# Patient Record
Sex: FEMALE | Race: BLACK OR AFRICAN AMERICAN | URBAN - METROPOLITAN AREA
[De-identification: names, ages, dates, MRNs, and addresses within clinical notes are randomized per-mention and may not be internally consistent; named-entity substitution may affect disease eponyms.]

---

## 2017-01-04 ENCOUNTER — OFFICE VISIT (OUTPATIENT)
Dept: OBGYN | Facility: CLINIC | Age: 42
End: 2017-01-04
Payer: COMMERCIAL

## 2017-01-04 VITALS
HEIGHT: 70 IN | WEIGHT: 215 LBS | DIASTOLIC BLOOD PRESSURE: 78 MMHG | BODY MASS INDEX: 30.78 KG/M2 | SYSTOLIC BLOOD PRESSURE: 112 MMHG

## 2017-01-04 DIAGNOSIS — Z09 POSTOPERATIVE EXAMINATION: Primary | ICD-10-CM

## 2017-01-04 DIAGNOSIS — Z09 POSTOP CHECK: Primary | ICD-10-CM

## 2017-01-04 LAB — HGB BLD-MCNC: 9 G/DL (ref 11.7–15.7)

## 2017-01-04 PROCEDURE — 85018 HEMOGLOBIN: CPT | Performed by: OBSTETRICS & GYNECOLOGY

## 2017-01-04 PROCEDURE — 36415 COLL VENOUS BLD VENIPUNCTURE: CPT | Performed by: OBSTETRICS & GYNECOLOGY

## 2017-01-04 PROCEDURE — 99212 OFFICE O/P EST SF 10 MIN: CPT | Performed by: OBSTETRICS & GYNECOLOGY

## 2017-01-04 NOTE — PROGRESS NOTES
The patient was seen for her postoperative check. Her hemoglobin is 9 g percent. And she needs to be on iron twice a day. Her pathology over resect the fibroid was benign. The intraoperative photos of resection of 3 fibroids and the thick band was undertaken and explained at length to the patient. Bimanual examination is unremarkable. The patient will return in 3 months for a follow-up myoma check.

## 2017-01-04 NOTE — MR AVS SNAPSHOT
"              After Visit Summary   1/4/2017    Jose Antonio Johnson    MRN: [id number]           Patient Information     Date Of Birth          1975        Visit Information        Provider Department      1/4/2017 2:15 PM Raheem Heath MD [facility]        Today's Diagnoses     Postop check    -  1        Follow-ups after your visit        Who to contact     If you have questions or need follow up information about today's clinic visit or your schedule please contact [facility] directly at [phone].  Normal or non-critical lab and imaging results will be communicated to you by mCASHhart, letter or phone within 4 business days after the clinic has received the results. If you do not hear from us within 7 days, please contact the clinic through [employer] or phone. If you have a critical or abnormal lab result, we will notify you by phone as soon as possible.  Submit refill requests through [employer] or call your pharmacy and they will forward the refill request to us. Please allow 3 business days for your refill to be completed.          Additional Information About Your Visit        MyChart Information     [employer] gives you secure access to your electronic health record. If you see a primary care provider, you can also send messages to your care team and make appointments. If you have questions, please call your primary care clinic.  If you do not have a primary care provider, please call [phone] and they will assist you.        Care EveryWhere ID     This is your Care EveryWhere ID. This could be used by other organizations to access your [facility] medical records  [id number]        Your Vitals Were     Height BMI (Body Mass Index) Last Period             5' 10\" (1.778 m) 30.85 kg/m2 12/22/2016          Blood Pressure from Last 3 Encounters:   01/04/17 112/78   12/22/16 108/73   12/08/16 120/86    Weight from Last 3 Encounters:   01/04/17 215 lb (97.523 kg)   12/22/16 " 211 lb 11.2 oz (96.026 kg)   12/08/16 214 lb (97.07 kg)              Today, you had the following     No orders found for display       Primary Care Provider    None       No address on file        Thank you!     Thank you for choosing [facility]  for your care. Our goal is always to provide you with excellent care. Hearing back from our patients is one way we can continue to improve our services. Please take a few minutes to complete the written survey that you may receive in the mail after your visit with us. Thank you!             Your Updated Medication List - Protect others around you: Learn how to safely use, store and throw away your medicines at www.disposemymeds.org.          This list is accurate as of: 1/4/17  3:44 PM.  Always use your most recent med list.                   Brand Name Dispense Instructions for use    estrogens (conjugated) 1.25 MG tablet    PREMARIN    21 tablet    Take 1 tablet (1.25 mg) by mouth daily

## 2017-04-07 ENCOUNTER — RECORDS - HEALTHEAST (OUTPATIENT)
Dept: ADMINISTRATIVE | Facility: OTHER | Age: 42
End: 2017-04-07

## 2017-04-07 ENCOUNTER — AMBULATORY - HEALTHEAST (OUTPATIENT)
Dept: LAB | Facility: CLINIC | Age: 42
End: 2017-04-07

## 2017-04-07 DIAGNOSIS — N97.0 FEMALE INFERTILITY ASSOCIATED WITH ANOVULATION: ICD-10-CM

## 2017-04-07 DIAGNOSIS — Z32.00 PREGNANCY EXAMINATION OR TEST, PREGNANCY UNCONFIRMED: ICD-10-CM

## 2017-07-24 ENCOUNTER — TELEPHONE (OUTPATIENT)
Dept: FAMILY MEDICINE | Facility: CLINIC | Age: 42
End: 2017-07-24

## 2017-07-24 ENCOUNTER — OFFICE VISIT (OUTPATIENT)
Dept: FAMILY MEDICINE | Facility: CLINIC | Age: 42
End: 2017-07-24
Payer: COMMERCIAL

## 2017-07-24 ENCOUNTER — NURSE TRIAGE (OUTPATIENT)
Dept: NURSING | Facility: CLINIC | Age: 42
End: 2017-07-24

## 2017-07-24 VITALS
TEMPERATURE: 98.2 F | WEIGHT: 214 LBS | BODY MASS INDEX: 30.71 KG/M2 | HEART RATE: 106 BPM | OXYGEN SATURATION: 100 % | DIASTOLIC BLOOD PRESSURE: 76 MMHG | SYSTOLIC BLOOD PRESSURE: 118 MMHG

## 2017-07-24 DIAGNOSIS — E04.9 ENLARGED THYROID: ICD-10-CM

## 2017-07-24 DIAGNOSIS — G47.00 INSOMNIA, UNSPECIFIED TYPE: ICD-10-CM

## 2017-07-24 DIAGNOSIS — R00.2 PALPITATIONS: ICD-10-CM

## 2017-07-24 DIAGNOSIS — N92.6 MISSED PERIOD: Primary | ICD-10-CM

## 2017-07-24 DIAGNOSIS — E05.90 HYPERTHYROIDISM: ICD-10-CM

## 2017-07-24 LAB
BASOPHILS # BLD AUTO: 0 10E9/L (ref 0–0.2)
BASOPHILS NFR BLD AUTO: 0.2 %
DIFFERENTIAL METHOD BLD: ABNORMAL
EOSINOPHIL # BLD AUTO: 0 10E9/L (ref 0–0.7)
EOSINOPHIL NFR BLD AUTO: 0.5 %
ERYTHROCYTE [DISTWIDTH] IN BLOOD BY AUTOMATED COUNT: 19.4 % (ref 10–15)
HCG SERPL QL: NEGATIVE
HCT VFR BLD AUTO: 30.5 % (ref 35–47)
HGB BLD-MCNC: 9.2 G/DL (ref 11.7–15.7)
LYMPHOCYTES # BLD AUTO: 1.5 10E9/L (ref 0.8–5.3)
LYMPHOCYTES NFR BLD AUTO: 36 %
MCH RBC QN AUTO: 22 PG (ref 26.5–33)
MCHC RBC AUTO-ENTMCNC: 30.2 G/DL (ref 31.5–36.5)
MCV RBC AUTO: 73 FL (ref 78–100)
MONOCYTES # BLD AUTO: 0.7 10E9/L (ref 0–1.3)
MONOCYTES NFR BLD AUTO: 16.7 %
NEUTROPHILS # BLD AUTO: 2 10E9/L (ref 1.6–8.3)
NEUTROPHILS NFR BLD AUTO: 46.6 %
PLATELET # BLD AUTO: 254 10E9/L (ref 150–450)
RBC # BLD AUTO: 4.18 10E12/L (ref 3.8–5.2)
WBC # BLD AUTO: 4.2 10E9/L (ref 4–11)

## 2017-07-24 PROCEDURE — 84439 ASSAY OF FREE THYROXINE: CPT | Performed by: PHYSICIAN ASSISTANT

## 2017-07-24 PROCEDURE — 36415 COLL VENOUS BLD VENIPUNCTURE: CPT | Performed by: PHYSICIAN ASSISTANT

## 2017-07-24 PROCEDURE — 93000 ELECTROCARDIOGRAM COMPLETE: CPT | Performed by: PHYSICIAN ASSISTANT

## 2017-07-24 PROCEDURE — 86376 MICROSOMAL ANTIBODY EACH: CPT | Performed by: PHYSICIAN ASSISTANT

## 2017-07-24 PROCEDURE — 99215 OFFICE O/P EST HI 40 MIN: CPT | Performed by: PHYSICIAN ASSISTANT

## 2017-07-24 PROCEDURE — 80048 BASIC METABOLIC PNL TOTAL CA: CPT | Performed by: PHYSICIAN ASSISTANT

## 2017-07-24 PROCEDURE — 86800 THYROGLOBULIN ANTIBODY: CPT | Performed by: PHYSICIAN ASSISTANT

## 2017-07-24 PROCEDURE — 84703 CHORIONIC GONADOTROPIN ASSAY: CPT | Performed by: PHYSICIAN ASSISTANT

## 2017-07-24 PROCEDURE — 82728 ASSAY OF FERRITIN: CPT | Performed by: PHYSICIAN ASSISTANT

## 2017-07-24 PROCEDURE — 84443 ASSAY THYROID STIM HORMONE: CPT | Performed by: PHYSICIAN ASSISTANT

## 2017-07-24 PROCEDURE — 85025 COMPLETE CBC W/AUTO DIFF WBC: CPT | Performed by: PHYSICIAN ASSISTANT

## 2017-07-24 RX ORDER — TRAZODONE HYDROCHLORIDE 50 MG/1
50 TABLET, FILM COATED ORAL
Qty: 30 TABLET | Refills: 1 | Status: SHIPPED | OUTPATIENT
Start: 2017-07-24

## 2017-07-24 NOTE — TELEPHONE ENCOUNTER
New symptom of palpitation with chest burning for up to 1 minute , that started 7/20/17 . Agrees to appointment sent to . .Livia Hamlin RN [facility] nurse advisors.    Reason for Disposition    [1] Chest pain lasting <= 5 minutes AND [2] NO chest pain or cardiac symptoms now(Exceptions: pains lasting a few seconds)    Additional Information    Negative: Passed out (i.e., lost consciousness, collapsed and was not responding)    Negative: Shock suspected (e.g., cold/pale/clammy skin, too weak to stand, low BP, rapid pulse)    Negative: Difficult to awaken or acting confused  (e.g., disoriented, slurred speech)    Negative: Visible sweat on face or sweat dripping down face    Negative: Unable to walk, or can only walk with assistance (e.g., requires support)    Negative: [1] Received SHOCK from implantable cardiac defibrillator AND [2] persisting symptoms (i.e., palpitations, lightheadedness)    Negative: Sounds like a life-threatening emergency to the triager    Chest pain    Negative: Severe difficulty breathing (e.g., struggling for each breath, speaks in single words)    Negative: Difficult to awaken or acting confused (e.g., disoriented, slurred speech)    Negative: Shock suspected (e.g., cold/pale/clammy skin, too weak to stand, low BP, rapid pulse)    Negative: [1] Chest pain lasts > 5 minutes AND [2] history of heart disease  (i.e., heart attack, bypass surgery, angina, angioplasty, CHF; not just a heart murmur)    Negative: [1] Chest pain lasts > 5 minutes AND [2] described as crushing, pressure-like, or heavy    Negative: [1] Chest pain lasts > 5 minutes AND [2] age > 50    Negative: [1] Chest pain lasts > 5 minutes AND [2] age > 30 AND [3] at least one cardiac risk factor (i.e., hypertension, diabetes, obesity, smoker or strong family history of heart disease)    Negative: [1] Chest pain lasts > 5 minutes AND [2] not relieved with nitroglycerin    Negative: Passed out (i.e., lost consciousness,  "collapsed and was not responding)    Negative: Heart beating < 50 beats per minute OR > 140 beats per minute    Negative: Visible sweat on face or sweat dripping down face    Negative: Sounds like a life-threatening emergency to the triager    Negative: Followed a chest injury    Negative: SEVERE chest pain    Negative: [1] Intermittent  chest pain or \"angina\" AND [2] increasing in severity or frequency  (Exception: pains lasting a few seconds)    Negative: Pain also present in shoulder(s) or arm(s) or jaw  (Exception: pain is clearly made worse by movement)    Negative: Difficulty breathing    Negative: Dizziness or lightheadedness    Negative: Coughing up blood    Negative: Cocaine use within last 3 days    Negative: History of prior \"blood clot\" in leg or lungs (i.e., deep vein thrombosis, pulmonary embolism)    Negative: Recent illness requiring prolonged bedrest (i.e., immobilization)    Negative: Hip or leg fracture in past 2 months (e.g., had cast on leg or ankle)    Negative: Major surgery in the past month    Negative: Recent long-distance travel with prolonged time in car, bus, plane, or train (i.e., within past 2 weeks; 6 or  more hours duration)    Negative: Chest pain lasts > 5 minutes (Exceptions: chest pain occurring > 3 days ago and now asymptomatic; same as previously diagnosed heartburn and has accompanying sour taste in mouth)    Negative: Taking a deep breath makes pain worse    Negative: Patient sounds very sick or weak to the triager    Negative: [1] Chest pain lasts > 5 minutes AND [2] occurred > 3 days ago (72 hours) AND [3] NO chest pain or cardiac symptoms now    Protocols used: CHEST PAIN-ADULT-, HEART RATE AND HEARTBEAT QUESTIONS-ADULT-    "

## 2017-07-24 NOTE — TELEPHONE ENCOUNTER
Reason for call:  Same Day Appointment   Requested Provider: Any     PCP: [unfilled]    Reason for visit: Triage-heart palpatations    Duration of symptoms: see triage notes     Have you been treated for this in the past? No    Additional comments: none        Phone number to reach patient:  Home number on file [phone] (home)    Best Time:  any    Can we leave a detailed message on this number?  YES

## 2017-07-24 NOTE — MR AVS SNAPSHOT
After Visit Summary   7/24/2017    Jose Antonio Johnson    MRN: [id number]           Patient Information     Date Of Birth          1975        Visit Information        Provider Department      7/24/2017 3:00 PM Bella Jenkins PA-C [facility]        Today's Diagnoses     Missed period    -  1    Palpitations        Enlarged thyroid          Care Instructions    Labs today  Call to schedule thyroid ultrasound  Quit caffeine  Work on getting 8 hours of sleep every night  Return to clinic for any new or worsening symptoms or go to ER Urgent care in off hours            Follow-ups after your visit        Future tests that were ordered for you today     Open Future Orders        Priority Expected Expires Ordered    US Thyroid Routine  7/24/2018 7/24/2017            Who to contact     If you have questions or need follow up information about today's clinic visit or your schedule please contact [facility] directly at [phone].  Normal or non-critical lab and imaging results will be communicated to you by MyChart, letter or phone within 4 business days after the clinic has received the results. If you do not hear from us within 7 days, please contact the clinic through [employer] or phone. If you have a critical or abnormal lab result, we will notify you by phone as soon as possible.  Submit refill requests through [employer] or call your pharmacy and they will forward the refill request to us. Please allow 3 business days for your refill to be completed.          Additional Information About Your Visit        MyChart Information     [employer] gives you secure access to your electronic health record. If you see a primary care provider, you can also send messages to your care team and make appointments. If you have questions, please call your primary care clinic.  If you do not have a primary care provider, please call [phone] and they will assist you.        Care  EveryWhere ID     This is your Care EveryWhere ID. This could be used by other organizations to access your [facility] medical records  [id number]        Your Vitals Were     Pulse Temperature Pulse Oximetry BMI (Body Mass Index)          106 98.2  F (36.8  C) (Oral) 100% 30.71 kg/m2         Blood Pressure from Last 3 Encounters:   07/24/17 118/76   01/04/17 112/78   12/22/16 108/73    Weight from Last 3 Encounters:   07/24/17 214 lb (97.1 kg)   01/04/17 215 lb (97.5 kg)   12/22/16 211 lb 11.2 oz (96 kg)              We Performed the Following     ANTI THYROGLOBULIN ANTIBODY     Basic metabolic panel  (Ca, Cl, CO2, Creat, Gluc, K, Na, BUN)     CBC with platelets and differential     EKG 12-lead complete w/read - Clinics     Ferritin     HCG, qualitative     THYROID PEROXIDASE ANTIBODY     TSH with free T4 reflex        Primary Care Provider    None       No address on file        Equal Access to Services     CRISTINA FARNSWORTH : Kiya Montgomery, wasteve stephens, qaliza kaalmarobin rai, mallory ayon . So [facility] [phone].    ATENCIÓN: Si nickla español, tiene a wiley disposición servicios gratuitos de asistencia lingüística. Llame al [phone].    We comply with applicable federal civil rights laws and [place] laws. We do not discriminate on the basis of race, color, national origin, age, disability sex, sexual orientation or gender identity.            Thank you!     Thank you for choosing [facility]  for your care. Our goal is always to provide you with excellent care. Hearing back from our patients is one way we can continue to improve our services. Please take a few minutes to complete the written survey that you may receive in the mail after your visit with us. Thank you!             Your Updated Medication List - Protect others around you: Learn how to safely use, store and throw away your medicines at www.disposemymeds.org.          This list is accurate  as of: 7/24/17  3:35 PM.  Always use your most recent med list.                   Brand Name Dispense Instructions for use Diagnosis    estrogens (conjugated) 1.25 MG tablet    PREMARIN    21 tablet    Take 1 tablet (1.25 mg) by mouth daily    Intramural leiomyoma of uterus

## 2017-07-24 NOTE — NURSING NOTE
"Chief Complaint   Patient presents with     Palpitations       Initial /76  Pulse 106  Temp 98.2  F (36.8  C) (Oral)  Wt 214 lb (97.1 kg)  SpO2 100%  BMI 30.71 kg/m2 Estimated body mass index is 30.71 kg/(m^2) as calculated from the following:    Height as of 1/4/17: 5' 10\" (1.778 m).    Weight as of this encounter: 214 lb (97.1 kg).  Medication Reconciliation: complete     Blanco Farfan MA      "

## 2017-07-24 NOTE — PROGRESS NOTES
SUBJECTIVE:                                                    Jose Antonio Johnson is a 41 year old female who presents to clinic today for the following health issues:    Palpitations      Onset: Last Thursday    Description:   Location:  entire chest  Character: racing and skipping beat.   Radiation: none  Duration: couple minutes     Intensity: mild    Progression of Symptoms:  same    Accompanying Signs & Symptoms:  Shortness of breath: no   Sweating: no   Nausea/vomiting: no   Lightheadedness: no   Palpitations: YES    Fever/Chills: YES- sometimes but no fever  Cough: no, but fatigue and shakiness   Heartburn: no     History:   Family history of heart disease YES- father  Tobacco use: no     Precipitating factors:   Worse with exertion: YES  Worse with deep breaths :  no   Related to food: no     Alleviating factors:  none     Therapies Tried and outcome: None    They will be going with an egg donor  IVF isn't working    They will be moving to [place] because her  is done with his fellowship.     She has been getting 4-5 hours of sleep per day. 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     History of iron deficiency anemia  She stopped her supplements    No dry skin, thinning hair, heat or cold intolerance    Reports she's 2 days late for her period. She is very regular and usually never misses a period. They have been trying to get pregnant this month      Problem list and histories reviewed & adjusted, as indicated.  Additional history: as documented    ROS:  C: NEGATIVE for fever, chills, change in weight  I: NEGATIVE for worrisome rashes, moles or lesions  E: NEGATIVE for vision changes or irritation  E/M: NEGATIVE for ear, mouth and throat problems  R: NEGATIVE for significant cough or SOB  B:  NEGATIVE for masses, tenderness or discharge  CV: NEGATIVE for chest pain/chest pressure, dyspnea on exertion, lower extremity edema, orthopnea and syncope or near-syncope  GI: NEGATIVE for nausea, abdominal pain, heartburn, or change in bowel habits  : NEGATIVE for frequency, dysuria, or hematuria  M: NEGATIVE for significant arthralgias or myalgia  N: NEGATIVE for weakness, dizziness or paresthesias  E: NEGATIVE for temperature intolerance, skin/hair changes  H: NEGATIVE for bleeding problems  P: NEGATIVE for changes in mood or affect    Patient Active Problem List   Diagnosis     Iron deficiency anemia     Intramural leiomyoma of uterus     Female infertility     Past Surgical History:   Procedure Laterality Date     HYSTEROSCOPY      fibroid removal     HYSTEROSCOPY, ABLATE ENDOMETRIUM VERSAPOINT , COMBINED N/A 12/22/2016    Procedure: COMBINED HYSTEROSCOPY, ABLATE ENDOMETRIUM VERSAPOINT;  Surgeon: Raheem Heath MD;  Location: [facility]       Social History   Substance Use Topics     Smoking status: Never Smoker     Smokeless tobacco: Never Used     Alcohol use Yes      Comment: occasionally     Family History   Problem Relation Age of Onset     DIABETES Mother      Heart Defect Father      mitral regurg     DIABETES Other      Breast Cancer No family hx of      Colon Cancer No family hx of      CEREBROVASCULAR DISEASE No family hx of      Coronary Artery Disease No family hx of            Labs reviewed in EPIC    OBJECTIVE:                                                    /76  Pulse 106  Temp 98.2  F (36.8  C) (Oral)  Wt 214 lb (97.1 kg)  SpO2 100%  BMI 30.71 kg/m2 Body mass index is 30.71 kg/(m^2).   GENERAL: healthy, alert, well nourished, well hydrated, no distress  EYES: Eyes grossly normal to inspection, extraocular movements - intact, and PERRL  HENT: ear canals- normal; TMs- normal; Nose- normal; Mouth- no ulcers, no lesions  NECK: no tenderness, no adenopathy, no asymmetry, no masses, no  stiffness; thyroid- enlarged  RESP: lungs clear to auscultation - no rales, no rhonchi, no wheezes  CV: regular rates and rhythm, normal S1 S2, no S3 or S4 and no murmur, no click or rub -  ABDOMEN: soft, no tenderness, no  hepatosplenomegaly, no masses, normal bowel sounds  MS: extremities- no gross deformities noted, no edema  SKIN: no suspicious lesions, no rashes  NEURO: strength and tone- normal, sensory exam- grossly normal, mentation- intact, speech- normal, reflexes- symmetric  PSYCH: Alert and oriented times 3; speech- coherent , normal rate and volume; able to articulate logical thoughts, able to abstract reason, no tangential thoughts, no hallucinations or delusions, affect- normal    No results found for this or any previous visit (from the past 24 hour(s)).       ASSESSMENT/PLAN:                                                        ICD-10-CM    1. Missed period N92.6 HCG, qualitative   2. Palpitations R00.2 Ferritin     Basic metabolic panel  (Ca, Cl, CO2, Creat, Gluc, K, Na, BUN)     EKG 12-lead complete w/read - Clinics     TSH with free T4 reflex   3. Enlarged thyroid E01.0 ANTI THYROGLOBULIN ANTIBODY     THYROID PEROXIDASE ANTIBODY     CBC with platelets and differential     US Thyroid   4. Insomnia, unspecified type G47.00 traZODone (DESYREL) 50 MG tablet     Check labs. Rule out pregnancy. Rule out anemia. Check thyroid since this is enlarged. She has a family history of thyroid disorder. Trazodone for sleep. Trial of melatonin first    > 45 minutes were spent with the patient and / or family present in education and / or counseling regarding the above issues.  This represented more than 50% of the time spent interacting with the patient during this visit.     Patient Instructions   Labs today  Call to schedule thyroid ultrasound  Quit caffeine  Work on getting 8 hours of sleep every night  Return to clinic for any new or worsening symptoms or go to ER Urgent care in off hours          Estimated  "body mass index is 30.71 kg/(m^2) as calculated from the following:    Height as of 1/4/17: 5' 10\" (1.778 m).    Weight as of this encounter: 214 lb (97.1 kg).       Bella Jenkins  [facility]    "

## 2017-07-24 NOTE — PATIENT INSTRUCTIONS
Labs today  Call to schedule thyroid ultrasound  Quit caffeine  Work on getting 8 hours of sleep every night  Return to clinic for any new or worsening symptoms or go to ER Urgent care in off hours

## 2017-07-25 LAB
ANION GAP SERPL CALCULATED.3IONS-SCNC: 5 MMOL/L (ref 3–14)
BUN SERPL-MCNC: 9 MG/DL (ref 7–30)
CALCIUM SERPL-MCNC: 8.6 MG/DL (ref 8.5–10.1)
CHLORIDE SERPL-SCNC: 107 MMOL/L (ref 94–109)
CO2 SERPL-SCNC: 27 MMOL/L (ref 20–32)
CREAT SERPL-MCNC: 0.47 MG/DL (ref 0.52–1.04)
FERRITIN SERPL-MCNC: 5 NG/ML (ref 12–150)
GFR SERPL CREATININE-BSD FRML MDRD: ABNORMAL ML/MIN/1.7M2
GLUCOSE SERPL-MCNC: 93 MG/DL (ref 70–99)
POTASSIUM SERPL-SCNC: 4.2 MMOL/L (ref 3.4–5.3)
SODIUM SERPL-SCNC: 139 MMOL/L (ref 133–144)
T4 FREE SERPL-MCNC: 2.38 NG/DL (ref 0.76–1.46)
THYROGLOB AB SERPL IA-ACNC: 24 IU/ML (ref 0–40)
THYROPEROXIDASE AB SERPL-ACNC: <10 IU/ML
TSH SERPL DL<=0.005 MIU/L-ACNC: <0.01 MU/L (ref 0.4–4)

## 2017-07-25 RX ORDER — PROPRANOLOL HCL 60 MG
60 CAPSULE, EXTENDED RELEASE 24HR ORAL DAILY
Qty: 90 CAPSULE | Refills: 0 | Status: SHIPPED | OUTPATIENT
Start: 2017-07-25

## 2017-07-25 NOTE — PROGRESS NOTES
TSH is very low. She was prescribed LA propranolol for palpitations and instructed to follow up with endocrinology as soon as possible.

## 2017-07-26 ENCOUNTER — TELEPHONE (OUTPATIENT)
Dept: ENDOCRINOLOGY | Facility: CLINIC | Age: 42
End: 2017-07-26

## 2017-07-26 NOTE — TELEPHONE ENCOUNTER
----- Message from Cee Whitley sent at 7/26/2017  4:29 PM CDT -----  Regarding: New PT referral  Contact: [phone]  PT called today and said she had an appt scheduled out in [place] in Nov but was hoping she might be seen sooner here. PT has a referral from Bella Jenkins for hyperthyroidism with heart palpitations. PT said the referring provider is wanting her to be seen as soon as possible. Please follow up with [phone].    Thank You!  [facility]      Please DO NOT send this message and/or reply back to sender.  Call Center Representatives DO NOT respond to messages.

## 2017-07-27 ENCOUNTER — TELEPHONE (OUTPATIENT)
Dept: ENDOCRINOLOGY | Facility: CLINIC | Age: 42
End: 2017-07-27

## 2017-07-27 NOTE — TELEPHONE ENCOUNTER
----- Message from Luann Patel MD sent at 7/26/2017  9:25 PM CDT -----  Regarding: RE: New PT referral  Contact: [phone]  Consult service this week or next week  ----- Message -----     From: Heydi Cole RN     Sent: 7/26/2017   5:17 PM       To: Luann Patel MD  Subject: FW: New PT referral                              Scheduled in   November . Needs sooner here hyperthyroid   ----- Message -----     From: Cee Whitley     Sent: 7/26/2017   4:29 PM       To: [facility] Triage-  Subject: New PT referral                                  PT called today and said she had an appt scheduled out in [place] in Nov but was hoping she might be seen sooner here. PT has a referral from Bella Jenkins for hyperthyroidism with heart palpitations. PT said the referring provider is wanting her to be seen as soon as possible. Please follow up with [phone].    Thank You!  Cee  Call Center      Please DO NOT send this message and/or reply back to sender.  Call Center Representatives DO NOT respond to messages.

## 2017-07-27 NOTE — TELEPHONE ENCOUNTER
Please schedule with  Dr Molina with Dr Trujillo to see first Friday 7/28/17 10:30 AM NEW hyperthyroid.per Dr Patel

## 2017-07-28 ENCOUNTER — OFFICE VISIT (OUTPATIENT)
Dept: ENDOCRINOLOGY | Facility: CLINIC | Age: 42
End: 2017-07-28

## 2017-07-28 VITALS
SYSTOLIC BLOOD PRESSURE: 121 MMHG | BODY MASS INDEX: 30.78 KG/M2 | HEART RATE: 102 BPM | HEIGHT: 70 IN | DIASTOLIC BLOOD PRESSURE: 76 MMHG | WEIGHT: 215 LBS

## 2017-07-28 DIAGNOSIS — E05.00 GRAVES DISEASE: Primary | ICD-10-CM

## 2017-07-28 DIAGNOSIS — E05.90 HYPERTHYROIDISM: ICD-10-CM

## 2017-07-28 DIAGNOSIS — E05.00 GRAVES DISEASE: ICD-10-CM

## 2017-07-28 LAB — T3 SERPL-MCNC: 276 NG/DL (ref 60–181)

## 2017-07-28 RX ORDER — SENNA AND DOCUSATE SODIUM 50; 8.6 MG/1; MG/1
TABLET, FILM COATED ORAL
COMMUNITY
Start: 2017-07-24

## 2017-07-28 RX ORDER — PNV NO.95/FERROUS FUM/FOLIC AC 28MG-0.8MG
TABLET ORAL
COMMUNITY
Start: 2017-07-25

## 2017-07-28 RX ORDER — PROPYLTHIOURACIL 50 MG/1
100 TABLET ORAL 3 TIMES DAILY
Qty: 90 TABLET | Refills: 1 | Status: SHIPPED | OUTPATIENT
Start: 2017-07-28 | End: 2017-09-20

## 2017-07-28 RX ORDER — YOHIMBE BARK 500 MG
CAPSULE ORAL
COMMUNITY
Start: 2017-07-25

## 2017-07-28 ASSESSMENT — ENCOUNTER SYMPTOMS
PALPITATIONS: 1
FATIGUE: 1
SWOLLEN GLANDS: 1
TACHYCARDIA: 1

## 2017-07-28 ASSESSMENT — PAIN SCALES - GENERAL: PAINLEVEL: NO PAIN (0)

## 2017-07-28 NOTE — PROGRESS NOTES
ASSESSMENT/PLAN:     ## Hyperthyroidism, most likely due to Graves' disease  ## Planning  Pregnancy  Pt presented with clinical hyperthyroidism with no prior history of fever/ neck pain/ contrast which would suggest thyroiditis. On U/S her thyroid gland is diffusely enlarged with increase vascularity, no nodules. This is most likely Graves disease. Pt is clinically hyperthyroid  but no eye/ compressive symptoms. Her evaluation and management are challenging given the fact that she is moving out of state in the next 2-4 weeks and in the process planning IVF for pregnancy. Moderate to severe hyperthyroidism carries negative outcome on pregnancy and we recommend postpone pregnancy until a stable euthyroid state is reached. We discussed the risks and benefits of various methods to treat hyperthyroidism. Discussed use of Antithyroid drugs(ATDs),   ATDs carry risk of rare but very serious adverse event including agranulocytosis and liver failure (PTU is a third medication leading to liver transplant in the [place]) and birth defect (Methimazole>PTU).  We also discussed consequences of no treatment and surgical option. We recommend methimazole as preferred therapy until she gets pregnant and then to switch to PTU and then switch back to methimazole at second trimester. She would like to proceed with pregnancy as soon as possible so does not want to consider I-131 Ablative therapy. Patient after discussion with her  would like start PTU now.      --  recommend discuss new diagnoses of  hyperthyroidism with her OB  -- Confirm diagnosis of Graves' disease with TSI, would not do  uptake and scan as she is not considering I-131 ablation and has immediate plans for pregnancy  -- check Total T3  -- At this point, patient and family prefer ATDs over iodine ablation  -- Continue propranolol at current dose 60 mg   -- After confirm diagnosis of Graves' disease, will start ATDs. Pt and  are aware of serious side effect  of PTU (especially liver dysfunction and agranulocytosis), however, given lower risk of birth defect, they would prefer to start with PTU. Plan to start with  mg TID  -- Since Graves' disease and it's treartment will need frequent clinical and labs follow up initially, recommmed start to look for establishing care with endocrinologist in [place] where she is relocating    We will contact the patient with labs results and further recommendations.     Patient seen and examined with staff endocrinologist Dr Molina.     Charles Trujillo MD  Diabetes, Metabolism and Endocrinology Fellow  Pager: [phone]  ---------------------------------------------------------------------------------------------------------------------------    Chief complaint:  Jose Antonio is a 41 year old female seen in consultation at the request of Bella Jenkins PA-C for hyperthyroidism.      HISTORY OF PRESENT ILLNESS  2 weeks ago, Jose Antonio started to feel heart pounding and noticed that her neck is enlarging.  Patient denied fever/ throat pain. 1 week ago, she started to feel shaky and worsening heart pounding, to the point that she feels like she is going to pass out. 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 She reported no change in her weight/ change in mood/ anxiety/ insomnia. She denied fever/ neck pain/ IV contrast prior to the episode. She reports feeling that the thyroid may be enlarged while swallowing, but reported no difficulty swallowing/ no changes in her voices/ no problem breathing. No eye pain/ blurry vision.  She went to her PCP 7/24/17 for palpitation and SOB, and found to have suppressed TSH with elevated FT4. TPO neg and Tg Ab WNL. Labs also significant for anemia with iron deficiency and negative pregnancy. Pt was started on propranolol 60 mg with improvement in her symptoms. Pt does not take medication  today.    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 Her  just finished transplant fellowship and they plan to move to [place] August 16th after he returns from [place] on August 13th.    Jose Antonio reports history of 'thyroid problem' when she was 17. It was back in [facility] and she was not sure the diagnosis and treatment. [place] has been in the [place] for 15 years. Her mother's sister had thyroid surgery 3 months ago in [facility]. She is not sure what type of thyroid problem her aunt has. Otherwise, no other family member with thyroid disease/ autoimmune disease.    She had problems with uterine fibroid with hypermenmorhea s/p surgery in 201. She has not been taking iron supplement. Her last surgery for fibroid was in 2016, when she try to get pregnant. Jose Antonio and her  are trying to conceive, went to OB/GYN and are in process to help with fertility. They plan to travel back to [place] for fertility clinic.    REVIEW OF SYSTEMS  Answers for HPI/ROS submitted by the patient on 7/28/2017   General Symptoms: Yes  Skin Symptoms: No  HENT Symptoms: No  EYE SYMPTOMS: No  HEART SYMPTOMS: Yes  LUNG SYMPTOMS: No  INTESTINAL SYMPTOMS: No  URINARY SYMPTOMS: No  GYNECOLOGIC SYMPTOMS: No  BREAST SYMPTOMS: No  SKELETAL SYMPTOMS: No  BLOOD SYMPTOMS: Yes  NERVOUS SYSTEM SYMPTOMS: No  MENTAL HEALTH SYMPTOMS: No  Fatigue: Yes  Chest pain or pressure: Yes  Fast or irregular heartbeat: Yes  Fast heart beat: Yes  Anemia: Yes  Swollen glands: Yes    10 point negative except as mentioned in HPI    Past Medical/Surgical History:  Past Medical History:   Diagnosis Date     Anemia      Female infertility      Fibroids      Past Surgical History:   Procedure Laterality Date     HYSTEROSCOPY      fibroid removal     HYSTEROSCOPY, ABLATE ENDOMETRIUM VERSAPOINT , COMBINED N/A 12/22/2016    Procedure: COMBINED HYSTEROSCOPY, ABLATE ENDOMETRIUM VERSAPOINT;  Surgeon: Raheem Heath MD;  Location: [facility]  "      Medications  Current Outpatient Prescriptions   Medication     Lactobacillus (ACIDOPHILUS) 100 MG CAPS     Omega-3 Fatty Acids (FISH OIL OMEGA-3) 1000 MG CAPS     Prenatal Vit-Fe Fum-FA-Omega (PRENATAL MULTI +DHA PO)     Sennosides-Docusate Sodium (SENNA-DOCUSATE SODIUM) 8.6-50 MG TABS     ferrous sulfate Dried (SLOW IRON) 160 (50 FE) MG tablet     propylthiouracil (PTU) 50 MG tablet     propranolol (INDERAL LA) 60 MG 24 hr capsule     traZODone (DESYREL) 50 MG tablet     No current facility-administered medications for this visit.        Current Outpatient Prescriptions   Medication Sig Dispense Refill     Lactobacillus (ACIDOPHILUS) 100 MG CAPS        Omega-3 Fatty Acids (FISH OIL OMEGA-3) 1000 MG CAPS        Prenatal Vit-Fe Fum-FA-Omega (PRENATAL MULTI +DHA PO)        Sennosides-Docusate Sodium (SENNA-DOCUSATE SODIUM) 8.6-50 MG TABS        ferrous sulfate Dried (SLOW IRON) 160 (50 FE) MG tablet        propylthiouracil (PTU) 50 MG tablet Take 2 tablets (100 mg) by mouth 3 times daily 90 tablet 1     propranolol (INDERAL LA) 60 MG 24 hr capsule Take 1 capsule (60 mg) by mouth daily 90 capsule 0     traZODone (DESYREL) 50 MG tablet Take 1 tablet (50 mg) by mouth nightly as needed for sleep 30 tablet 1       Allergies  No Known Allergies      Family History  family history includes DIABETES in her mother and another family member; Heart Defect in her father. There is no history of Breast Cancer, Colon Cancer, CEREBROVASCULAR DISEASE, or Coronary Artery Disease.    Social History  - do not drink, do not smoke  - plan to move to [place] August, 16 2017    Physical Exam  /76  Pulse 102  Ht 1.778 m (5' 10\")  Wt 97.5 kg (215 lb)  BMI 30.85 kg/m2  Body mass index is 30.85 kg/(m^2).  GENERAL :  In no apparent distress, shaky  SKIN: Normal color, normal temperature, texture.  No hirsutism, alopecia or purple striae.   EYES: PERRL, EOMI, No scleral icterus,  No proptosis, conjunctival redness, stare, " retraction  MOUTH: Moist, pink; pharynx clear  NECK: No visible masses. No palpable adenopathy, or masses. No carotid bruits. THYROID:  Visible enlarge, diffuse enlarge with smooth / firm texture,  no nodules, no Bruit   RESP: Lungs clear to auscultation bilaterally  CARDIAC: Regular rhythm tachycardia, normal S1 S2, without murmurs, rubs or gallops  ABDOMEN: Normal bowel sounds; soft, nontender, no HSM or masses       NEURO: awake, alert, responds appropriately to questions.  Cranial nerves intact.  Moves all extremities; Gait normal.  Positive tremor of the outstretched hand.  DTRs   2/4 ,   EXTREMITIES: No clubbing, cyanosis. Trace edema.    DATA REVIEW  Labs/Imaging  US thyroid 7/24/17  History: Enlarged thyroid     Technique: Grayscale and color ultrasound imaging of the thyroid was  performed.     Findings:       Diffusely heterogeneous thyroid parenchyma with increased vascularity.  No evidence for thyroid nodules.     The right lobe of the thyroid measures 1.9 x 1.6 x 5.6 cm.      The thyroid isthmus measures 5 mm.      The left lobe of the thyroid measures 2.5 x 1.6 x 5.7 cm.          Impression: Mildly enlarged, diffusely heterogeneous thyroid with  increased vascularity. Correlate for various etiologies of  thyroiditis.  TSH   Date Value Ref Range Status   07/24/2017 <0.01 (L) 0.40 - 4.00 mU/L Final     T4 Free   Date Value Ref Range Status   07/24/2017 2.38 (H) 0.76 - 1.46 ng/dL Final     Labs 7/24/17  TPO neg  Tg Ab 24  HCG neg  Hgb 9.2 Ferritin 5     I have seen and examined the patient, reviewed and edited the fellow's note, and agree with the plan of care.  BI Sparks

## 2017-07-28 NOTE — MR AVS SNAPSHOT
After Visit Summary   7/28/2017    Jose Antonio Johnson    MRN: [id number]           Patient Information     Date Of Birth          1975        Visit Information        Provider Department      7/28/2017 10:30 AM Martha Molina MD [facility] Endocrinology        Today's Diagnoses     Graves disease    -  1    Hyperthyroidism          Care Instructions    - Schedule the uptake and scan  - Start methimazole if confirm Graves' disease    To expedite your medication refill(s), please contact your pharmacy and have them fax a refill request to: [phone].  *Please allow 3 business days for routine medication refills.  *Please allow 5 business days for controlled substance medication refills.  --------------------  For scheduling appointments (including lab work), please request an appointment through [employer], or call: [phone].    For questions for your provider or the endocrine nurse, please send a [employer] message.  For after-hours urgent issues, please dial [phone], and ask to speak with the Endocrinologist On-Call.  --------------------  Please Note: If you are active on [employer], all future test results will be sent by [employer] message only and will no longer be sent by mail. You may also receive communication directly from your physician.            Follow-ups after your visit        Your next 10 appointments already scheduled     Aug 02, 2017 10:00 AM CDT   NM THYroid with UUNMINJ1   [facility], Nuclear Medicine ([facility])    [address] [address] [phone]            Aug 03, 2017 10:00 AM CDT   NM THYROID UPTAKE AND SCAN with UUNM3   [facility], Nuclear Medicine ([facility])    [address] [address] [phone]           If you take a thyroid hormone, you should stop taking it 3 to 6 weeks before your test or treatment. Your doctor  will tell you when to stop taking it.  For 3 to 4 weeks before your test or treatment, avoid foods or medicines that contain large amounts of iodine. These include seafood, iodized salt, cold medicine and IV dye (used during medical exams). Tell your doctor if you ve had any of these in the last two months.  For an uptake and scan: Stop eating 4 hours before your first visit. You may drink water.  For thyroid therapy: Stop all food and drink (including water) 1 hour before your first visit.  Women of child-bearing age: Tell your doctor if you are breastfeeding or if there s any chance you may be pregnant. If you will have thyroid therapy, you must have a pregnancy test within 48 hours.  Please bring a list of your medicines to the hospital. Include vitamins, minerals and over-the-counter drugs.  You should wear loose clothing, such as a sweat suit or jogging clothes. We may ask you to undress and put on a hospital gown.  You may need to remove your watch and any jewelry. It is safest to leave personal items at home.              Future tests that were ordered for you today     Open Future Orders        Priority Expected Expires Ordered    NM Thyroid uptake and scan Routine  2/23/2018 7/28/2017            Who to contact     Please call your clinic at [phone] to:    Ask questions about your health    Make or cancel appointments    Discuss your medicines    Learn about your test results    Speak to your doctor   If you have compliments or concerns about an experience at your clinic, or if you wish to file a complaint, please contact [facility] Patient Relations at [phone] or email us at [email or web address]         Additional Information About Your Visit        MyChart Information     [employer] gives you secure access to your electronic health record. If you see a primary care provider, you can also send messages to your care team and make appointments. If you have  "questions, please call your primary care clinic.  If you do not have a primary care provider, please call [phone] and they will assist you.      [employer] is an electronic gateway that provides easy, online access to your medical records. With [employer], you can request a clinic appointment, read your test results, renew a prescription or communicate with your care team.     To access your existing account, please contact your [facility] or call [phone] for assistance.        Care EveryWhere ID     This is your Care EveryWhere ID. This could be used by other organizations to access your [place] medical records  [id number]        Your Vitals Were     Pulse Height BMI (Body Mass Index)             102 1.778 m (5' 10\") 30.85 kg/m2          Blood Pressure from Last 3 Encounters:   07/28/17 121/76   07/24/17 118/76   01/04/17 112/78    Weight from Last 3 Encounters:   07/28/17 97.5 kg (215 lb)   07/24/17 97.1 kg (214 lb)   01/04/17 97.5 kg (215 lb)                 Today's Medication Changes          These changes are accurate as of: 7/28/17 11:42 AM.  If you have any questions, ask your nurse or doctor.               Stop taking these medicines if you haven't already. Please contact your care team if you have questions.     estrogens (conjugated) 1.25 MG tablet   Commonly known as:  PREMARIN   Stopped by:  Martha Molina MD                    Primary Care Provider Office Phone # Fax #    Bella Luigi Jenkins PA-C [phone]       [facility] [address] [facility] [address]  [facility] [address]        Equal Access to Services     [facility] : Hadii hiren gutierrez Soessie, waaxda luqadaha, qaybta kaalmamallory velazquez. So [facility] [phone].    ATENCIÓN: Si habla español, tiene a wiley disposición servicios gratuitos de asistencia lingüística. Llame al [phone].    We comply with applicable federal civil rights " laws and [place] laws. We do not discriminate on the basis of race, color, national origin, age, disability sex, sexual orientation or gender identity.            Thank you!     Thank you for choosing [facility] ENDOCRINOLOGY  for your care. Our goal is always to provide you with excellent care. Hearing back from our patients is one way we can continue to improve our services. Please take a few minutes to complete the written survey that you may receive in the mail after your visit with us. Thank you!             Your Updated Medication List - Protect others around you: Learn how to safely use, store and throw away your medicines at www.disposemymeds.org.          This list is accurate as of: 7/28/17 11:42 AM.  Always use your most recent med list.                   Brand Name Dispense Instructions for use Diagnosis    Acidophilus 100 MG Caps       Graves disease, Hyperthyroidism       FISH OIL OMEGA-3 1000 MG Caps       Graves disease, Hyperthyroidism       PRENATAL MULTI +DHA PO       Graves disease, Hyperthyroidism       propranolol 60 MG 24 hr capsule    INDERAL LA    90 capsule    Take 1 capsule (60 mg) by mouth daily    Palpitations, Hyperthyroidism       SENNA-docusate sodium 8.6-50 MG Tabs       Graves disease, Hyperthyroidism       SLOW IRON 160 (50 FE) MG tablet   Generic drug:  ferrous sulfate Dried       Graves disease, Hyperthyroidism       traZODone 50 MG tablet    DESYREL    30 tablet    Take 1 tablet (50 mg) by mouth nightly as needed for sleep    Insomnia, unspecified type

## 2017-07-28 NOTE — NURSING NOTE
Chief Complaint   Patient presents with     Consult     NEW PATIENT- HYPERTHYROID     Josie Dillon, [facility]  Endocrinology & Diabetes 3G

## 2017-07-28 NOTE — PATIENT INSTRUCTIONS
- Schedule the uptake and scan  - Start methimazole if confirm Graves' disease    To expedite your medication refill(s), please contact your pharmacy and have them fax a refill request to: [phone].  *Please allow 3 business days for routine medication refills.  *Please allow 5 business days for controlled substance medication refills.  --------------------  For scheduling appointments (including lab work), please request an appointment through [email or web address], or call: [phone].    For questions for your provider or the endocrine nurse, please send a [email or web address] message.  For after-hours urgent issues, please dial [phone], and ask to speak with the Endocrinologist On-Call.  --------------------  Please Note: If you are active on [email or web address], all future test results will be sent by [email or web address] message only and will no longer be sent by mail. You may also receive communication directly from your physician.

## 2017-07-28 NOTE — LETTER
7/28/2017       RE: Jose Antonio Johnson  [address]    [place] [address]     Dear Colleague,    Thank you for referring your patient, Jose Antonio Johnson, to the [facility] ENDOCRINOLOGY at [facility]. Please see a copy of my visit note below.        ASSESSMENT/PLAN:     ## Hyperthyroidism, most likely due to Graves' disease  ## Planning  Pregnancy  Pt presented with clinical hyperthyroidism with no prior history of fever/ neck pain/ contrast which would suggest thyroiditis. On U/S her thyroid gland is diffusely enlarged with increase vascularity, no nodules. This is most likely Graves disease. Pt is clinically hyperthyroid  but no eye/ compressive symptoms. Her evaluation and management are challenging given the fact that she is moving out of state in the next 2-4 weeks and in the process planning IVF for pregnancy. Moderate to severe hyperthyroidism carries negative outcome on pregnancy and we recommend postpone pregnancy until a stable euthyroid state is reached. We discussed the risks and benefits of various methods to treat hyperthyroidism. Discussed use of Antithyroid drugs(ATDs),   ATDs carry risk of rare but very serious adverse event including agranulocytosis and liver failure (PTU is a third medication leading to liver transplant in the [place]) and birth defect (Methimazole>PTU).  We also discussed consequences of no treatment and surgical option. We recommend methimazole as preferred therapy until she gets pregnant and then to switch to PTU and then switch back to methimazole at second trimester. She would like to proceed with pregnancy as soon as possible so does not want to consider I-131 Ablative therapy. Patient after discussion with her  would like start PTU now.      --  recommend discuss new diagnoses of  hyperthyroidism with her OB  -- Confirm diagnosis of Graves' disease with TSI, would not do  uptake and scan as she is not considering I-131 ablation and  has immediate plans for pregnancy  -- check Total T3  -- At this point, patient and family prefer ATDs over iodine ablation  -- Continue propranolol at current dose 60 mg   -- After confirm diagnosis of Graves' disease, will start ATDs. Pt and  are aware of serious side effect of PTU (especially liver dysfunction and agranulocytosis), however, given lower risk of birth defect, they would prefer to start with PTU. Plan to start with  mg TID  -- Since Graves' disease and it's treartment will need frequent clinical and labs follow up initially, recommmed start to look for establishing care with endocrinologist in [place] where she is relocating    We will contact the patient with labs results and further recommendations.     Patient seen and examined with staff endocrinologist Dr Molina.     Charles Trujillo MD  Diabetes, Metabolism and Endocrinology Fellow  Pager: [phone]  ---------------------------------------------------------------------------------------------------------------------------    Chief complaint:  Jose Antonio is a 41 year old female seen in consultation at the request of Bella Jenkins PA-C for hyperthyroidism.      HISTORY OF PRESENT ILLNESS  2 weeks ago, Jose Antonio started to feel heart pounding and noticed that her neck is enlarging.  Patient denied fever/ throat pain. 1 week ago, she started to feel shaky and worsening heart pounding, to the point that she feels like she is going to pass out. 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 She reported no change in her weight/ change in mood/ anxiety/ insomnia. She denied fever/ neck pain/ IV contrast prior to the episode. She reports feeling that the thyroid may be enlarged while swallowing, but reported no difficulty swallowing/ no changes in her voices/ no problem breathing. No eye pain/ blurry vision.  She went to her PCP 7/24/17  for palpitation and SOB, and found to have suppressed TSH with elevated FT4. TPO neg and Tg Ab WNL. Labs also significant for anemia with iron deficiency and negative pregnancy. Pt was started on propranolol 60 mg with improvement in her symptoms. Pt does not take medication today.    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 Her  just finished transplant fellowship and they plan to move to [place] August 16th after he returns from [place] on August 13th.    Jose Antonio reports history of 'thyroid problem' when she was 17. It was back in [facility] and she was not sure the diagnosis and treatment. She has been in the [place] for 15 years. Her mother's sister had thyroid surgery 3 months ago in [facility]. She is not sure what type of thyroid problem her aunt has. Otherwise, no other family member with thyroid disease/ autoimmune disease.    She had problems with uterine fibroid with hypermenmorhea s/p surgery in 201. She has not been taking iron supplement. Her last surgery for fibroid was in 2016, when she try to get pregnant. Jose Antonio and her  are trying to conceive, went to OB/GYN and are in process to help with fertility. They plan to travel back to [place] for fertility clinic.    REVIEW OF SYSTEMS  Answers for HPI/ROS submitted by the patient on 7/28/2017   General Symptoms: Yes  Skin Symptoms: No  HENT Symptoms: No  EYE SYMPTOMS: No  HEART SYMPTOMS: Yes  LUNG SYMPTOMS: No  INTESTINAL SYMPTOMS: No  URINARY SYMPTOMS: No  GYNECOLOGIC SYMPTOMS: No  BREAST SYMPTOMS: No  SKELETAL SYMPTOMS: No  BLOOD SYMPTOMS: Yes  NERVOUS SYSTEM SYMPTOMS: No  MENTAL HEALTH SYMPTOMS: No  Fatigue: Yes  Chest pain or pressure: Yes  Fast or irregular heartbeat: Yes  Fast heart beat: Yes  Anemia: Yes  Swollen glands: Yes    10 point negative except as mentioned in HPI    Past Medical/Surgical History:  Past Medical History:   Diagnosis Date     Anemia      Female infertility      Fibroids      Past Surgical History:  "  Procedure Laterality Date     HYSTEROSCOPY      fibroid removal     HYSTEROSCOPY, ABLATE ENDOMETRIUM VERSAPOINT , COMBINED N/A 12/22/2016    Procedure: COMBINED HYSTEROSCOPY, ABLATE ENDOMETRIUM VERSAPOINT;  Surgeon: Raheem Heath MD;  Location: [facility]       Medications  Current Outpatient Prescriptions   Medication     Lactobacillus (ACIDOPHILUS) 100 MG CAPS     Omega-3 Fatty Acids (FISH OIL OMEGA-3) 1000 MG CAPS     Prenatal Vit-Fe Fum-FA-Omega (PRENATAL MULTI +DHA PO)     Sennosides-Docusate Sodium (SENNA-DOCUSATE SODIUM) 8.6-50 MG TABS     ferrous sulfate Dried (SLOW IRON) 160 (50 FE) MG tablet     propylthiouracil (PTU) 50 MG tablet     propranolol (INDERAL LA) 60 MG 24 hr capsule     traZODone (DESYREL) 50 MG tablet     No current facility-administered medications for this visit.        Current Outpatient Prescriptions   Medication Sig Dispense Refill     Lactobacillus (ACIDOPHILUS) 100 MG CAPS        Omega-3 Fatty Acids (FISH OIL OMEGA-3) 1000 MG CAPS        Prenatal Vit-Fe Fum-FA-Omega (PRENATAL MULTI +DHA PO)        Sennosides-Docusate Sodium (SENNA-DOCUSATE SODIUM) 8.6-50 MG TABS        ferrous sulfate Dried (SLOW IRON) 160 (50 FE) MG tablet        propylthiouracil (PTU) 50 MG tablet Take 2 tablets (100 mg) by mouth 3 times daily 90 tablet 1     propranolol (INDERAL LA) 60 MG 24 hr capsule Take 1 capsule (60 mg) by mouth daily 90 capsule 0     traZODone (DESYREL) 50 MG tablet Take 1 tablet (50 mg) by mouth nightly as needed for sleep 30 tablet 1       Allergies  No Known Allergies      Family History  family history includes DIABETES in her mother and another family member; Heart Defect in her father. There is no history of Breast Cancer, Colon Cancer, CEREBROVASCULAR DISEASE, or Coronary Artery Disease.    Social History  - do not drink, do not smoke  - plan to move to [place] August, 16 2017    Physical Exam  /76  Pulse 102  Ht 1.778 m (5' 10\")  Wt 97.5 kg (215 lb)  BMI 30.85 kg/m2  Body " mass index is 30.85 kg/(m^2).  GENERAL :  In no apparent distress, shaky  SKIN: Normal color, normal temperature, texture.  No hirsutism, alopecia or purple striae.   EYES: PERRL, EOMI, No scleral icterus,  No proptosis, conjunctival redness, stare, retraction  MOUTH: Moist, pink; pharynx clear  NECK: No visible masses. No palpable adenopathy, or masses. No carotid bruits. THYROID:  Visible enlarge, diffuse enlarge with smooth / firm texture,  no nodules, no Bruit   RESP: Lungs clear to auscultation bilaterally  CARDIAC: Regular rhythm tachycardia, normal S1 S2, without murmurs, rubs or gallops  ABDOMEN: Normal bowel sounds; soft, nontender, no HSM or masses       NEURO: awake, alert, responds appropriately to questions.  Cranial nerves intact.  Moves all extremities; Gait normal.  Positive tremor of the outstretched hand.  DTRs   2/4 ,   EXTREMITIES: No clubbing, cyanosis. Trace edema.    DATA REVIEW  Labs/Imaging  US thyroid 7/24/17  History: Enlarged thyroid     Technique: Grayscale and color ultrasound imaging of the thyroid was  performed.     Findings:       Diffusely heterogeneous thyroid parenchyma with increased vascularity.  No evidence for thyroid nodules.     The right lobe of the thyroid measures 1.9 x 1.6 x 5.6 cm.      The thyroid isthmus measures 5 mm.      The left lobe of the thyroid measures 2.5 x 1.6 x 5.7 cm.          Impression: Mildly enlarged, diffusely heterogeneous thyroid with  increased vascularity. Correlate for various etiologies of  thyroiditis.  TSH   Date Value Ref Range Status   07/24/2017 <0.01 (L) 0.40 - 4.00 mU/L Final     T4 Free   Date Value Ref Range Status   07/24/2017 2.38 (H) 0.76 - 1.46 ng/dL Final     Labs 7/24/17  TPO neg  Tg Ab 24  HCG neg  Hgb 9.2 Ferritin 5     I have seen and examined the patient, reviewed and edited the fellow's note, and agree with the plan of care.  BI Sparks

## 2017-08-02 LAB — TSI SER-ACNC: 7.6

## 2017-08-03 ENCOUNTER — TELEPHONE (OUTPATIENT)
Dept: ENDOCRINOLOGY | Facility: CLINIC | Age: 42
End: 2017-08-03

## 2017-08-03 NOTE — TELEPHONE ENCOUNTER
Patient called regarding lab result.   Left voicemail  As expected TSI was positive consistent with hyperthyroidism due to Graves disease.  Continue  mg tid, recheck Thyroid labs in 4 weeks.

## 2017-08-16 DIAGNOSIS — E05.90 HYPERTHYROIDISM: ICD-10-CM

## 2017-08-16 DIAGNOSIS — R00.2 PALPITATIONS: ICD-10-CM

## 2017-08-16 RX ORDER — PROPRANOLOL HCL 60 MG
60 CAPSULE, EXTENDED RELEASE 24HR ORAL DAILY
Qty: 90 CAPSULE | Refills: 0 | Status: CANCELLED | OUTPATIENT
Start: 2017-08-16

## 2017-08-16 NOTE — TELEPHONE ENCOUNTER
Propranolol er 60MG      Last Written Prescription Date: 7/25/17  Last Fill Quantity: 90, # refills: 0    Last Office Visit with G, P or [facility] prescribing provider:  7/24/17   Future Office Visit:        BP Readings from Last 3 Encounters:   07/28/17 121/76   07/24/17 118/76   01/04/17 112/78

## 2017-08-17 NOTE — TELEPHONE ENCOUNTER
Three months supply filled on 7/25/17    Closing encounter, no further action needed at this time    Tomeka Bustos RN  [facility]

## 2017-09-06 PROBLEM — E05.00 GRAVES DISEASE: Status: ACTIVE | Noted: 2017-09-06

## 2017-09-07 ENCOUNTER — TRANSFERRED RECORDS (OUTPATIENT)
Dept: HEALTH INFORMATION MANAGEMENT | Facility: CLINIC | Age: 42
End: 2017-09-07

## 2017-09-20 DIAGNOSIS — E05.00 GRAVES DISEASE: ICD-10-CM

## 2017-09-20 DIAGNOSIS — E05.90 HYPERTHYROIDISM: ICD-10-CM

## 2017-09-20 RX ORDER — PROPYLTHIOURACIL 50 MG/1
50 TABLET ORAL 3 TIMES DAILY
Qty: 180 TABLET | Refills: 1 | Status: SHIPPED | OUTPATIENT
Start: 2017-09-20

## 2018-06-26 ENCOUNTER — HEALTH MAINTENANCE LETTER (OUTPATIENT)
Age: 43
End: 2018-06-26

## 2020-03-10 ENCOUNTER — HEALTH MAINTENANCE LETTER (OUTPATIENT)
Age: 45
End: 2020-03-10

## 2020-12-27 ENCOUNTER — HEALTH MAINTENANCE LETTER (OUTPATIENT)
Age: 45
End: 2020-12-27

## 2021-04-24 ENCOUNTER — HEALTH MAINTENANCE LETTER (OUTPATIENT)
Age: 46
End: 2021-04-24

## 2021-10-04 ENCOUNTER — HEALTH MAINTENANCE LETTER (OUTPATIENT)
Age: 46
End: 2021-10-04

## 2022-05-15 ENCOUNTER — HEALTH MAINTENANCE LETTER (OUTPATIENT)
Age: 47
End: 2022-05-15

## 2022-09-11 ENCOUNTER — HEALTH MAINTENANCE LETTER (OUTPATIENT)
Age: 47
End: 2022-09-11

## 2023-01-22 ENCOUNTER — HEALTH MAINTENANCE LETTER (OUTPATIENT)
Age: 48
End: 2023-01-22

## 2023-06-03 ENCOUNTER — HEALTH MAINTENANCE LETTER (OUTPATIENT)
Age: 48
End: 2023-06-03